# Patient Record
Sex: MALE | Race: BLACK OR AFRICAN AMERICAN | Employment: UNEMPLOYED | URBAN - METROPOLITAN AREA
[De-identification: names, ages, dates, MRNs, and addresses within clinical notes are randomized per-mention and may not be internally consistent; named-entity substitution may affect disease eponyms.]

---

## 2018-06-16 ENCOUNTER — HOSPITAL ENCOUNTER (EMERGENCY)
Age: 58
Discharge: HOME | End: 2018-06-16

## 2018-06-16 VITALS
HEIGHT: 73 IN | DIASTOLIC BLOOD PRESSURE: 89 MMHG | OXYGEN SATURATION: 96 % | TEMPERATURE: 98.4 F | RESPIRATION RATE: 20 BRPM | WEIGHT: 200 LBS | HEART RATE: 77 BPM | SYSTOLIC BLOOD PRESSURE: 157 MMHG | BODY MASS INDEX: 26.51 KG/M2

## 2018-06-16 DIAGNOSIS — G89.29 CHRONIC BILATERAL LOW BACK PAIN WITH RIGHT-SIDED SCIATICA: Primary | ICD-10-CM

## 2018-06-16 DIAGNOSIS — M54.41 CHRONIC BILATERAL LOW BACK PAIN WITH RIGHT-SIDED SCIATICA: Primary | ICD-10-CM

## 2018-06-16 PROCEDURE — 6370000000 HC RX 637 (ALT 250 FOR IP): Performed by: NURSE PRACTITIONER

## 2018-06-16 PROCEDURE — 99282 EMERGENCY DEPT VISIT SF MDM: CPT

## 2018-06-16 RX ORDER — OXYCODONE HYDROCHLORIDE AND ACETAMINOPHEN 5; 325 MG/1; MG/1
2 TABLET ORAL ONCE
Status: COMPLETED | OUTPATIENT
Start: 2018-06-16 | End: 2018-06-16

## 2018-06-16 RX ADMIN — OXYCODONE HYDROCHLORIDE AND ACETAMINOPHEN 2 TABLET: 5; 325 TABLET ORAL at 01:25

## 2018-06-16 ASSESSMENT — PAIN SCALES - GENERAL
PAINLEVEL_OUTOF10: 9
PAINLEVEL_OUTOF10: 9

## 2018-06-16 ASSESSMENT — PAIN DESCRIPTION - ORIENTATION: ORIENTATION: LOWER;RIGHT

## 2018-06-16 ASSESSMENT — PAIN DESCRIPTION - DESCRIPTORS: DESCRIPTORS: ACHING;NUMBNESS

## 2018-06-16 ASSESSMENT — PAIN DESCRIPTION - LOCATION: LOCATION: BACK;TOE (COMMENT WHICH ONE)

## 2020-09-17 ENCOUNTER — HOSPITAL ENCOUNTER (INPATIENT)
Age: 60
LOS: 5 days | Discharge: HOME | DRG: 885 | End: 2020-09-22
Attending: EMERGENCY MEDICINE | Admitting: PSYCHIATRY & NEUROLOGY
Payer: OTHER GOVERNMENT

## 2020-09-17 PROBLEM — F22 PARANOIA (PSYCHOSIS) (HCC): Status: ACTIVE | Noted: 2020-09-17

## 2020-09-17 LAB
ACETAMINOPHEN LEVEL: <5 MCG/ML (ref 10–30)
ALBUMIN SERPL-MCNC: 3.7 G/DL (ref 3.5–5.2)
ALP BLD-CCNC: 59 U/L (ref 40–129)
ALT SERPL-CCNC: 25 U/L (ref 0–40)
AMPHETAMINE SCREEN, URINE: NOT DETECTED
ANION GAP SERPL CALCULATED.3IONS-SCNC: 9 MMOL/L (ref 7–16)
AST SERPL-CCNC: 22 U/L (ref 0–39)
BACTERIA: ABNORMAL /HPF
BARBITURATE SCREEN URINE: NOT DETECTED
BASOPHILS ABSOLUTE: 0.04 E9/L (ref 0–0.2)
BASOPHILS RELATIVE PERCENT: 0.6 % (ref 0–2)
BENZODIAZEPINE SCREEN, URINE: NOT DETECTED
BILIRUB SERPL-MCNC: 0.5 MG/DL (ref 0–1.2)
BILIRUBIN URINE: NEGATIVE
BLOOD, URINE: NEGATIVE
BUN BLDV-MCNC: 11 MG/DL (ref 6–20)
CALCIUM SERPL-MCNC: 9.3 MG/DL (ref 8.6–10.2)
CANNABINOID SCREEN URINE: POSITIVE
CHLORIDE BLD-SCNC: 107 MMOL/L (ref 98–107)
CLARITY: CLEAR
CO2: 25 MMOL/L (ref 22–29)
COCAINE METABOLITE SCREEN URINE: POSITIVE
COLOR: YELLOW
CREAT SERPL-MCNC: 1 MG/DL (ref 0.7–1.2)
EOSINOPHILS ABSOLUTE: 0.15 E9/L (ref 0.05–0.5)
EOSINOPHILS RELATIVE PERCENT: 2.2 % (ref 0–6)
ETHANOL: <10 MG/DL (ref 0–0.08)
FENTANYL SCREEN, URINE: NOT DETECTED
GFR AFRICAN AMERICAN: >60
GFR NON-AFRICAN AMERICAN: >60 ML/MIN/1.73
GLUCOSE BLD-MCNC: 82 MG/DL (ref 74–99)
GLUCOSE URINE: NEGATIVE MG/DL
HCT VFR BLD CALC: 42.9 % (ref 37–54)
HEMOGLOBIN: 15.5 G/DL (ref 12.5–16.5)
IMMATURE GRANULOCYTES #: 0.02 E9/L
IMMATURE GRANULOCYTES %: 0.3 % (ref 0–5)
KETONES, URINE: ABNORMAL MG/DL
LEUKOCYTE ESTERASE, URINE: NEGATIVE
LYMPHOCYTES ABSOLUTE: 1.97 E9/L (ref 1.5–4)
LYMPHOCYTES RELATIVE PERCENT: 28.5 % (ref 20–42)
Lab: ABNORMAL
MCH RBC QN AUTO: 31.8 PG (ref 26–35)
MCHC RBC AUTO-ENTMCNC: 36.1 % (ref 32–34.5)
MCV RBC AUTO: 88.1 FL (ref 80–99.9)
METHADONE SCREEN, URINE: NOT DETECTED
MONOCYTES ABSOLUTE: 0.75 E9/L (ref 0.1–0.95)
MONOCYTES RELATIVE PERCENT: 10.9 % (ref 2–12)
NEUTROPHILS ABSOLUTE: 3.98 E9/L (ref 1.8–7.3)
NEUTROPHILS RELATIVE PERCENT: 57.5 % (ref 43–80)
NITRITE, URINE: NEGATIVE
OPIATE SCREEN URINE: NOT DETECTED
OXYCODONE URINE: NOT DETECTED
PDW BLD-RTO: 12.5 FL (ref 11.5–15)
PH UA: 6.5 (ref 5–9)
PHENCYCLIDINE SCREEN URINE: NOT DETECTED
PLATELET # BLD: 266 E9/L (ref 130–450)
PMV BLD AUTO: 10.4 FL (ref 7–12)
POTASSIUM SERPL-SCNC: 3.6 MMOL/L (ref 3.5–5)
PROTEIN UA: NEGATIVE MG/DL
RBC # BLD: 4.87 E12/L (ref 3.8–5.8)
RBC UA: ABNORMAL /HPF (ref 0–2)
SALICYLATE, SERUM: <0.3 MG/DL (ref 0–30)
SARS-COV-2, NAAT: NOT DETECTED
SODIUM BLD-SCNC: 141 MMOL/L (ref 132–146)
SPECIFIC GRAVITY UA: 1.02 (ref 1–1.03)
TOTAL PROTEIN: 6.1 G/DL (ref 6.4–8.3)
TRICYCLIC ANTIDEPRESSANTS SCREEN SERUM: NEGATIVE NG/ML
UROBILINOGEN, URINE: 1 E.U./DL
WBC # BLD: 6.9 E9/L (ref 4.5–11.5)
WBC UA: ABNORMAL /HPF (ref 0–5)

## 2020-09-17 PROCEDURE — 93005 ELECTROCARDIOGRAM TRACING: CPT | Performed by: STUDENT IN AN ORGANIZED HEALTH CARE EDUCATION/TRAINING PROGRAM

## 2020-09-17 PROCEDURE — 80307 DRUG TEST PRSMV CHEM ANLYZR: CPT

## 2020-09-17 PROCEDURE — 80053 COMPREHEN METABOLIC PANEL: CPT

## 2020-09-17 PROCEDURE — 99283 EMERGENCY DEPT VISIT LOW MDM: CPT

## 2020-09-17 PROCEDURE — 81001 URINALYSIS AUTO W/SCOPE: CPT

## 2020-09-17 PROCEDURE — G0480 DRUG TEST DEF 1-7 CLASSES: HCPCS

## 2020-09-17 PROCEDURE — 85025 COMPLETE CBC W/AUTO DIFF WBC: CPT

## 2020-09-17 PROCEDURE — 1240000000 HC EMOTIONAL WELLNESS R&B

## 2020-09-17 PROCEDURE — U0002 COVID-19 LAB TEST NON-CDC: HCPCS

## 2020-09-17 RX ORDER — HYDROXYZINE PAMOATE 50 MG/1
50 CAPSULE ORAL 3 TIMES DAILY PRN
Status: DISCONTINUED | OUTPATIENT
Start: 2020-09-17 | End: 2020-09-22 | Stop reason: HOSPADM

## 2020-09-17 RX ORDER — MAGNESIUM HYDROXIDE/ALUMINUM HYDROXICE/SIMETHICONE 120; 1200; 1200 MG/30ML; MG/30ML; MG/30ML
30 SUSPENSION ORAL PRN
Status: DISCONTINUED | OUTPATIENT
Start: 2020-09-17 | End: 2020-09-22 | Stop reason: HOSPADM

## 2020-09-17 RX ORDER — ACETAMINOPHEN 325 MG/1
650 TABLET ORAL EVERY 6 HOURS PRN
Status: DISCONTINUED | OUTPATIENT
Start: 2020-09-17 | End: 2020-09-22 | Stop reason: HOSPADM

## 2020-09-17 RX ORDER — HALOPERIDOL 5 MG
5 TABLET ORAL EVERY 6 HOURS PRN
Status: DISCONTINUED | OUTPATIENT
Start: 2020-09-17 | End: 2020-09-22 | Stop reason: HOSPADM

## 2020-09-17 RX ORDER — TRAZODONE HYDROCHLORIDE 50 MG/1
50 TABLET ORAL NIGHTLY PRN
Status: DISCONTINUED | OUTPATIENT
Start: 2020-09-17 | End: 2020-09-22 | Stop reason: HOSPADM

## 2020-09-17 RX ORDER — HALOPERIDOL 5 MG/ML
5 INJECTION INTRAMUSCULAR EVERY 6 HOURS PRN
Status: DISCONTINUED | OUTPATIENT
Start: 2020-09-17 | End: 2020-09-22 | Stop reason: HOSPADM

## 2020-09-17 ASSESSMENT — ENCOUNTER SYMPTOMS
COUGH: 0
VOMITING: 0
NAUSEA: 0
BACK PAIN: 0
COLOR CHANGE: 0
CHEST TIGHTNESS: 0
DIARRHEA: 0
ABDOMINAL PAIN: 0
WHEEZING: 0
SHORTNESS OF BREATH: 0
CONSTIPATION: 0

## 2020-09-17 NOTE — ED NOTES
Emergency Department [facility] AN AFFILIATE OF [facility] Biopsychosocial Assessment Note    Chief Complaint:  The pt is a 64 yr old AA male who presents to the [place] referred by his counselor at the [place] due to thoughts to harm others and paranoia. MSE:  The pt presents calm and cooperative- good eye contact- oriented times 4- good historian. Reports on going auditory hallucinations which have been getting louder lately. Clinical Summary/History:  The pt is a 64 yr old AA male who presents to the ED referred by his counselor at the [place] due to thoughts to harm others and paranoia. He stated that he has not been taking his meds for the last several weeks and prior to that was inconsistent. He stated that even when he takes his meds they do not work. He stated that he fears taking the meds b/c he is afraid that he will get the side effects. He denied a hx of SI and inpt psych admits. He stated that he has had thoughts to harm others in the past but has never harmed anyone. He stated that he stays away from the public due to these thoughts and his paranoia. He reported  that he has PTSD due to his training in the [employer]. He stated that he had to do survival drills that caused his PTSD. 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 He stated that he got a lot of time b/c he was arrested a lot. He denies any hx of a violent crime and stated that he attributes his life being saved by doing a lot of [facility] time. Call to the [place] and spoke to Shlomo Dangelo who reported that there are no beds today. He stated that he will f/u with our psych montiel tomorrow. Gender  [x] Male [] Female [] Transgender  [] Other    Sexual Orientation    [x] Heterosexual [] Homosexual [] Bisexual [] Other    Suicidal Behavioral: CSSR-S Complete.   [] Reports:    [] Past [] Present   [x] Denies    Homicidal/ Violent Behavior  [x] Reports:   [x] Past [x] Present   [] Denies     Hallucinations/Delusions   [x] Reports: Voices that tell him to run or get away   [] Denies     Substance Use/Alcohol Use/Addiction: SBIRT Screen Complete.    [x] Reports:  Binge cocaine use  [] Denies     Trauma History  [x] Reports: custodial 20 yrs- released a couple yrs ago  [] Denies     Collateral Information:   None      Level of Care/Disposition Plan  [] Home:   [] Outpatient Provider:   [] Crisis Unit:   [x] Inpatient Psychiatric Unit: [facility]  [] Other:        Lizeth Cotter, [facility]  09/17/20 1935

## 2020-09-17 NOTE — ED NOTES
FIRST PROVIDER CONTACT ASSESSMENT NOTE      Department of Emergency Medicine   ED  First Provider Note   9/17/20  3:18 PM EDT    Chief Complaint: Psychiatric Evaluation (increased paranoia, hx PTSD, states off meds for last four days, -SI +HI, states he is hearing voices)      History of Present Illness:    Akbar Moreno is a 64 y.o. male who presents to the ED for feeling paranoid. He reports Hx of PTSD and [facility] Denies SI. Admits to the use of Cocaine and marijuana. States that he is hearing voices that tell him to run. Denies visual hallucinations. Focused Screening Exam:  Constitutional:  Alert, appears stated age and is in no distress.       *ALLERGIES*     Sulfa antibiotics     ED Triage Vitals [09/17/20 1504]   BP Temp Temp Source Pulse Resp SpO2 Height Weight   -- 97.1 °F (36.2 °C) Skin 80 18 97 % 6' 1\" (1.854 m) 220 lb (99.8 kg)        Initial Plan of Care:  Initiate Treatment-Testing, Proceed toTreatment Area When Bed Available for ED Attending/MLP to Continue Care    -----------------END OF FIRST PROVIDER CONTACT ASSESSMENT NOTE--------------  Electronically signed by NETO Cao CNP   DD: 9/17/20     NETO Cao CNP  09/17/20 1800

## 2020-09-18 PROBLEM — F31.2 BIPOLAR AFFECTIVE DISORDER, CURRENT EPISODE MANIC WITH PSYCHOTIC SYMPTOMS (HCC): Status: ACTIVE | Noted: 2020-09-18

## 2020-09-18 PROBLEM — F14.10 COCAINE ABUSE (HCC): Status: ACTIVE | Noted: 2020-09-18

## 2020-09-18 LAB
EKG ATRIAL RATE: 76 BPM
EKG P AXIS: 51 DEGREES
EKG P-R INTERVAL: 168 MS
EKG Q-T INTERVAL: 408 MS
EKG QRS DURATION: 88 MS
EKG QTC CALCULATION (BAZETT): 459 MS
EKG T AXIS: 8 DEGREES
EKG VENTRICULAR RATE: 76 BPM

## 2020-09-18 PROCEDURE — 93010 ELECTROCARDIOGRAM REPORT: CPT | Performed by: INTERNAL MEDICINE

## 2020-09-18 PROCEDURE — 99222 1ST HOSP IP/OBS MODERATE 55: CPT | Performed by: PSYCHIATRY & NEUROLOGY

## 2020-09-18 PROCEDURE — 6370000000 HC RX 637 (ALT 250 FOR IP): Performed by: PSYCHIATRY & NEUROLOGY

## 2020-09-18 PROCEDURE — 1240000000 HC EMOTIONAL WELLNESS R&B

## 2020-09-18 RX ORDER — POLYETHYLENE GLYCOL 3350 17 G
2 POWDER IN PACKET (EA) ORAL PRN
Status: DISCONTINUED | OUTPATIENT
Start: 2020-09-18 | End: 2020-09-22 | Stop reason: HOSPADM

## 2020-09-18 RX ORDER — ARIPIPRAZOLE 15 MG/1
15 TABLET ORAL DAILY
Status: DISCONTINUED | OUTPATIENT
Start: 2020-09-18 | End: 2020-09-22 | Stop reason: HOSPADM

## 2020-09-18 RX ORDER — DIVALPROEX SODIUM 250 MG/1
500 TABLET, DELAYED RELEASE ORAL EVERY 12 HOURS SCHEDULED
Status: DISCONTINUED | OUTPATIENT
Start: 2020-09-18 | End: 2020-09-20

## 2020-09-18 RX ADMIN — DIVALPROEX SODIUM 500 MG: 250 TABLET, DELAYED RELEASE ORAL at 21:48

## 2020-09-18 RX ADMIN — DIVALPROEX SODIUM 500 MG: 250 TABLET, DELAYED RELEASE ORAL at 12:41

## 2020-09-18 RX ADMIN — ARIPIPRAZOLE 15 MG: 15 TABLET ORAL at 12:42

## 2020-09-18 ASSESSMENT — SLEEP AND FATIGUE QUESTIONNAIRES
AVERAGE NUMBER OF SLEEP HOURS: 2
DIFFICULTY ARISING: NO
SLEEP PATTERN: DIFFICULTY FALLING ASLEEP;DISTURBED/INTERRUPTED SLEEP;RESTLESSNESS;NIGHTMARES/TERRORS
SLEEP PATTERN: DIFFICULTY FALLING ASLEEP
DO YOU HAVE DIFFICULTY SLEEPING: YES
DIFFICULTY STAYING ASLEEP: NO
DIFFICULTY STAYING ASLEEP: YES
RESTFUL SLEEP: NO
DO YOU USE A SLEEP AID: NO
AVERAGE NUMBER OF SLEEP HOURS: 5
DIFFICULTY FALLING ASLEEP: YES
DO YOU USE A SLEEP AID: YES
RESTFUL SLEEP: NO
DIFFICULTY ARISING: NO
DIFFICULTY FALLING ASLEEP: YES
DO YOU HAVE DIFFICULTY SLEEPING: YES

## 2020-09-18 ASSESSMENT — LIFESTYLE VARIABLES
HISTORY_ALCOHOL_USE: NO
HISTORY_ALCOHOL_USE: YES

## 2020-09-18 ASSESSMENT — PATIENT HEALTH QUESTIONNAIRE - PHQ9
SUM OF ALL RESPONSES TO PHQ QUESTIONS 1-9: 17
SUM OF ALL RESPONSES TO PHQ QUESTIONS 1-9: 17

## 2020-09-18 ASSESSMENT — PAIN SCALES - GENERAL: PAINLEVEL_OUTOF10: 0

## 2020-09-18 NOTE — PROGRESS NOTES
[facility]  Initial Interdisciplinary Treatment Plan NOTE    Review Date & Time: 09/18/2020 5026     Patient was in treatment team    Admission Type:   Admission Type: Involuntary    Reason for admission:  Reason for Admission: \"cause my threrapist suggested I go because I was spiraling out of control, hearing voices I couldn't stop, anxiety real high, paranoid\"      Estimated Length of Stay Update:  5-7 days   Estimated Discharge Date Update: 09/24/2020    PATIENT STRENGTHS:  Patient Strengths Strengths: Communication  Patient Strengths and Limitations:Limitations: Tendency to isolate self  Addictive Behavior:Addictive Behavior  In the past 3 months, have you felt or has someone told you that you have a problem with:  : None  Do you have a history of Chemical Use?: Yes  Do you have a history of Alcohol Use?: No  Do you have a history of Street Drug Abuse?: No  Histroy of Prescripton Drug Abuse?: No  Medical Problems:History reviewed. No pertinent past medical history.     EDUCATION:   Learner Progress Toward Treatment Goals: Reviewed group plan and strategies    Method: Small group    Outcome: Needs reinforcement    PATIENT GOALS:     PLAN/TREATMENT RECOMMENDATIONS UPDATE:09/20/2020    GOALS UPDATE:   Time frame for Short-Term Goals: 3-5 days     Jenna Peoples RN

## 2020-09-18 NOTE — ED NOTES
Patient accepted to 7S by Dr. Gerardo Ayala PENDING negative COVID result. Vladislav Vick RN notified.       Saida Howe RN  09/17/20 2053       Sadia Howe RN  09/17/20 2053

## 2020-09-18 NOTE — H&P
PSYCHIATRIC EVALUATION      CHIEF COMPLAINT: I spiral real bad every year and ended up doing crime and halfway. I was almost getting there and was paranoid impulsive and then called my outpatient counselor\"    HISTORY OF PRESENT ILLNESS:   Patient is a 72-year-old  -American man father of 3 children all are adult and is a retired  from [age] discharge honorably, currently on parole, with history of bipolar disorder and PTSD which is resolved noncompliant and follows up at [place] outpatient clinic in [facility], was living for 9 months with a girlfriend 22-year-old , who kicked him out about 1 and half month ago and he became homeless. He also has history of 20 years of imprisonment in [place] [facility] and currently on parole he has longstanding history of cocaine abuse but was sober for 20 years now started doing the cocaine 2 weeks ago again and he was ruminating about the relationship that ended up 1/2-month ago and thinking that she was getting more paranoid and she was thinking heart being in fetal and having homicidal suicidal thought. He said he did not want to commit any crime and that is why he called counselor who advised him to come to the ER. In the ED he was very paranoid and easily agitated. He was medically clear his urine tox was positive for cocaine and was admitted to [facility]. He tells me that the medication that he was getting not doing any good and he did not feel any difference. He was given Zoloft BuSpar and other medication which never worked for his bipolar illness and symptoms. 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 He got a lot of arrest in the past.  He denies any history of violent crime but he states he activities like being saved by doing a lot of present time. He endorses a lot of symptoms of paranoia and mood swings and volatile anger outburst at times.   He said he is currently going through this kind of symptoms and he is hyper does not take any sleep has high energy impulsivity and thinking that people are doing something. He is avoiding the public. He said that he has bad thoughts and paranoia. He said he wants to take the medication which will consistently make him stable. He said he is not compliant with the medication all the time. He also feels guilty about starting cocaine back but he said he does not want to be in that life again. He said he applied for VA disability but this pending as well as the Social Security benefit. He demonstrated poor insight and judgment but want some help at this time. He is still having those bad thoughts but there is no specific person he is homicidal towards. PAST PSYCHIATRIC HISTORY:  History of bipolar disorder PTSD but this is the first inpatient psychiatric hospitalization. Patient is being managed by counselor and nurse practitioner via [facility]. Patient said that no medication works for him and he wants something consistently in injectable form. PAST MEDICAL HISTORY: History reviewed. No pertinent past medical history. MEDICAL ROS:   All review of systems are negative except what is stated in HPI. ALLERGIES: Sulfa antibiotics    FAMILY PSYCHIATRIC HISTORY:  Sister has bipolar disorder    SUBSTANCE ABUSE HISTORY:   He was tested positive for cocaine. He said that he was sober for 20 years and now 2 weeks ago he relapsed on cocaine and he does not want to do it anymore.     VITALS: BP (!) 143/83   Pulse 64   Temp 98.2 °F (36.8 °C)   Resp 15   Ht 6' 1\" (1.854 m)   Wt 220 lb (99.8 kg)   SpO2 98%   BMI 29.03 kg/m²      Physical Examination:     Head: x  Atraumatic: x normocephalic  Skin and Mucosa        Moist x  Dry   Pale  x Normal   Neck:  Thyroid  Palpable   x  Not palpable   venus distention   adenopathy   Chest: x Clear   Rhonchi     Wheezing   CV:  xS1   xS2    xNo murmer   Abdomen:  x  Soft    Tender    Viceromegaly   Extremities:  x No Edema     Edema     Cranial Nerves Examination:     CN II:   xPupils are reactive to light  Pupils are non reactive to light  CN III, IV, VI:  xNo eye deviation    No diplopia or ptosis   CN V:    xFacial Sensation is intact     Facial Sensation is not intact   CN IIIV:   x Hearing is normal to rubbing fingers   CN IX, X:     xNormal gag reflex and phonation   CN XI:   xShoulder shrug and neck rotation is normal  CNXII:    xTongue is midline no deviation or atrophy        For further PE refer to ED note    MENTAL STATUS EXAM:   Mental status examination revealed a 46-year-old well-built well-nourished Afro-American man casually dressed calm cooperative but little guarded. Psychomotor revealed no agitation retardation or any other abnormal movement. Eye contact was fair. Speech was loud and fast.  Mood \"I am in the process of spiraling real bad\", affect is angry irritable easily agitated. Thought process with some flights of ideas and racing thoughts. Thought contents with paranoia and guarded suspiciousness and if forced to get away from the public or group. Denies suicidal ideation but still have thoughts of hurting others but contracts safety while he is here. Insight and judgment poor. Impulse control poor .  Cognitive function seem to at the baseline alert and oriented pleasant person      LABS:   Admission on 09/17/2020   Component Date Value Ref Range Status    WBC 09/17/2020 6.9  4.5 - 11.5 E9/L Final    RBC 09/17/2020 4.87  3.80 - 5.80 E12/L Final    Hemoglobin 09/17/2020 15.5  12.5 - 16.5 g/dL Final    Hematocrit 09/17/2020 42.9  37.0 - 54.0 % Final    MCV 09/17/2020 88.1  80.0 - 99.9 fL Final    MCH 09/17/2020 31.8  26.0 - 35.0 pg Final    MCHC 09/17/2020 36.1* 32.0 - 34.5 % Final    RDW 09/17/2020 12.5  11.5 - 15.0 fL Final    Platelets 41/88/5483 266  130 - 450 E9/L Final    MPV 09/17/2020 10.4  7.0 - 12.0 fL Final    Neutrophils % 09/17/2020 57.5  43.0 - 80.0 % Final    Immature Granulocytes % 09/17/2020 0.3  0.0 - 5.0 % Final    Lymphocytes % 09/17/2020 28.5  20.0 - 42.0 % Final    Monocytes % 09/17/2020 10.9  2.0 - 12.0 % Final    Eosinophils % 09/17/2020 2.2  0.0 - 6.0 % Final    Basophils % 09/17/2020 0.6  0.0 - 2.0 % Final    Neutrophils Absolute 09/17/2020 3.98  1.80 - 7.30 E9/L Final    Immature Granulocytes # 09/17/2020 0.02  E9/L Final    Lymphocytes Absolute 09/17/2020 1.97  1.50 - 4.00 E9/L Final    Monocytes Absolute 09/17/2020 0.75  0.10 - 0.95 E9/L Final    Eosinophils Absolute 09/17/2020 0.15  0.05 - 0.50 E9/L Final    Basophils Absolute 09/17/2020 0.04  0.00 - 0.20 E9/L Final    Sodium 09/17/2020 141  132 - 146 mmol/L Final    Potassium 09/17/2020 3.6  3.5 - 5.0 mmol/L Final    Chloride 09/17/2020 107  98 - 107 mmol/L Final    CO2 09/17/2020 25  22 - 29 mmol/L Final    Anion Gap 09/17/2020 9  7 - 16 mmol/L Final    Glucose 09/17/2020 82  74 - 99 mg/dL Final    BUN 09/17/2020 11  6 - 20 mg/dL Final    CREATININE 09/17/2020 1.0  0.7 - 1.2 mg/dL Final    GFR Non- 09/17/2020 >60  >=60 mL/min/1.73 Final    Comment: Chronic Kidney Disease: less than 60 ml/min/1.73 sq.m. Kidney Failure: less than 15 ml/min/1.73 sq.m. Results valid for patients 18 years and older.       GFR  09/17/2020 >60   Final    Calcium 09/17/2020 9.3  8.6 - 10.2 mg/dL Final    Total Protein 09/17/2020 6.1* 6.4 - 8.3 g/dL Final    Alb 09/17/2020 3.7  3.5 - 5.2 g/dL Final    Total Bilirubin 09/17/2020 0.5  0.0 - 1.2 mg/dL Final    Alkaline Phosphatase 09/17/2020 59  40 - 129 U/L Final    ALT 09/17/2020 25  0 - 40 U/L Final    AST 09/17/2020 22  0 - 39 U/L Final    Amphetamine Screen, Urine 09/17/2020 NOT DETECTED  Negative <1000 ng/mL Final    Barbiturate Screen, Ur 09/17/2020 NOT DETECTED  Negative < 200 ng/mL Final    Benzodiazepine Screen, Urine 09/17/2020 NOT DETECTED  Negative < 200 ng/mL Final    Cannabinoid Scrn, Ur 09/17/2020 POSITIVE* Negative < 50ng/mL Final    Cocaine Metabolite Screen, Urine 09/17/2020 POSITIVE* Negative < 300 ng/mL Final    Opiate Scrn, Ur 09/17/2020 NOT DETECTED  Negative < 300ng/mL Final    Note:  The Opiate Screen is not intended to detect Oxycodone.  PCP Screen, Urine 09/17/2020 NOT DETECTED  Negative < 25 ng/mL Final    Methadone Screen, Urine 09/17/2020 NOT DETECTED  Negative <300 ng/mL Final    Oxycodone Urine 09/17/2020 NOT DETECTED  Negative <100 ng/mL Final    FENTANYL SCREEN, URINE 09/17/2020 NOT DETECTED  Negative <1 ng/mL Final    Drug Screen Comment: 09/17/2020 see below   Final    Comment: These drug screen results are for medical purposes only and  should not be considered definitive or confirmed. The drug  methodology concentration value must be greater than or equal  to the cutoff to be reported as positive. Confirmatory testing  orders and/or interpretive screening questions can be directed  to toxicology at [phone]. The absence of expected drug(s) and/or metabolite(s) may be due  to inappropriate timing of specimen collection relative to drug  administration, poor drug absorption, diluted/adulterated urine,  or limitations of screening testing methodology.       Ethanol Lvl 09/17/2020 <10  mg/dL Final    Not Detected    Acetaminophen Level 09/17/2020 <5.0* 10.0 - 30.0 mcg/mL Final    Salicylate, Serum 54/86/6615 <0.3  0.0 - 30.0 mg/dL Final    TCA Scrn 09/17/2020 NEGATIVE  Cutoff:300 ng/mL Final    Color, UA 09/17/2020 Yellow  Straw/Yellow Final    Clarity, UA 09/17/2020 Clear  Clear Final    Glucose, Ur 09/17/2020 Negative  Negative mg/dL Final    Bilirubin Urine 09/17/2020 Negative  Negative Final    Ketones, Urine 09/17/2020 TRACE* Negative mg/dL Final    Specific Cyrus, UA 09/17/2020 1.020  1.005 - 1.030 Final    Blood, Urine 09/17/2020 Negative  Negative Final    pH, UA 09/17/2020 6.5  5.0 - 9.0 Final    Protein, UA 09/17/2020 Negative  Negative mg/dL Final    Urobilinogen, Urine 09/17/2020 1.0  <2.0 E.U./dL Final    Nitrite, Urine 09/17/2020 Negative  Negative Final    Leukocyte Esterase, Urine 09/17/2020 Negative  Negative Final    WBC, UA 09/17/2020 0-1  0 - 5 /HPF Final    RBC, UA 09/17/2020 1-3  0 - 2 /HPF Final    Bacteria, UA 09/17/2020 NONE SEEN  None Seen /HPF Final    SARS-CoV-2, NAAT 09/17/2020 Not Detected  Not Detected Final    Comment: Rapid NAAT:   Negative results should be treated as presumptive and,  if inconsistent with clinical signs and symptoms or necessary for  patient management, should be tested with an alternative molecular  assay. Negative results do not preclude SARS-CoV-2 infection and  should not be used as the sole basis for patient management decisions. This test has been authorized by the FDA under an Emergency Use  Authorization (EUA) for use by authorized laboratories.     Fact sheet for Healthcare Providers:  kstattoo.com  Fact sheet for Patients: kstattoo.com    METHODOLOGY: Isothermal Nucleic Acid Amplification      Ventricular Rate 09/17/2020 76  BPM Final    Atrial Rate 09/17/2020 76  BPM Final    P-R Interval 09/17/2020 168  ms Final    QRS Duration 09/17/2020 88  ms Final    Q-T Interval 09/17/2020 408  ms Final    QTc Calculation (Bazett) 09/17/2020 459  ms Final    P Axis 09/17/2020 51  degrees Final    T Axis 09/17/2020 8  degrees Final           MEDICATIONS: Current Facility-Administered Medications: nicotine polacrilex (COMMIT) lozenge 2 mg, 2 mg, Oral, PRN  divalproex (DEPAKOTE) DR tablet 500 mg, 500 mg, Oral, 2 times per day  ARIPiprazole (ABILIFY) tablet 15 mg, 15 mg, Oral, Daily  [START ON 9/22/2020] ARIPiprazole lauroxil (ARISTADA) injection 662 mg, 662 mg, Intramuscular, Q30 Days  acetaminophen (TYLENOL) tablet 650 mg, 650 mg, Oral, Q6H PRN  hydrOXYzine (VISTARIL) capsule 50 mg, 50 mg, Oral, TID PRN  haloperidol lactate (HALDOL) injection 5 mg, 5 mg, Intramuscular, Q6H PRN **OR** haloperidol (HALDOL) tablet 5 mg, 5 mg, Oral, Q6H PRN  traZODone (DESYREL) tablet 50 mg, 50 mg, Oral, Nightly PRN  magnesium hydroxide (MILK OF MAGNESIA) 400 MG/5ML suspension 30 mL, 30 mL, Oral, Daily PRN  aluminum & magnesium hydroxide-simethicone (MAALOX) 200-200-20 MG/5ML suspension 30 mL, 30 mL, Oral, PRN     ASSESSMENT:   Bipolar disorder current episode manic with psychotic feature  Cocaine abuse    PLAN:   Collateral information  Group  Echols-milieu  Psychoeducation  Educated the patient that if he chooses to continue with the drugs cocaine alcohol, he may potentially act out impulsively more, resulting in serious harm to self or others even though unintentional.  Also counseled that mental health treatment cannot be optimized with ongoing use of drugs  He demonstrated understanding and has the capacity to understand that  I also discussed the diagnosis treatment plan and long-term injectable form and he accepted the treatment and consented to the plan.   I discussed the risk benefits side effect possible outcome and alternatives of the medication and he is agreeable to the plan  We will start Depakote 500 mg twice daily and Abilify 15 mg daily  We will start Aristada injection 662 mg every 30 days first 1 next week as ordered  Medical to follow-up with any medical issue  Referral to drug rehab counseling at the time of discharge  Expected length of stay 3 to 5 days based on stability  [facility] we may transfer the patient if patient is agreeable      Signed:  Ines Chilel  9/18/2020  1:46 PM

## 2020-09-18 NOTE — ED NOTES
Rickey Rosenthal from the [place] called and stated that they do not have a bed and will follow up tomorrow.      [address] [facility]  09/17/20 2052

## 2020-09-18 NOTE — GROUP NOTE
Group Therapy Note    Date: 9/18/2020    Group Start Time: 1125  Group End Time: 1200  Group Topic: Cognitive Skills    [facility]    VALERIE Dale        Group Therapy Note    Attendees: 18         Patient's Goal:  Pt will be able to identify negative communication principles they want to work on and be able to identify positive communication principles. Pt will be able to display good understanding of I feel statements. Notes: Pt active in class discussion. Status After Intervention:  Improved    Participation Level:  Active Listener and Interactive    Participation Quality: Appropriate, Attentive, Sharing and Supportive      Speech:  normal      Thought Process/Content: Logical      Affective Functioning: Blunted      Mood: depressed      Level of consciousness:  Alert, Oriented x4 and Attentive      Response to Learning: Able to verbalize current knowledge/experience, Able to verbalize/acknowledge new learning and Progressing to goal      Endings: None Reported    Modes of Intervention: Education, Support, Socialization and Problem-solving      Discipline Responsible: /Counselor      Signature:  VALERIE Grant

## 2020-09-18 NOTE — ED PROVIDER NOTES
Patient is a 53-year-old male with past medical history significant for PTSD and depression who presents the ED today with chief complaint of paranoia and for psych eval.  Patient states that he has PTSD secondary to being a  and that he has been having increased paranoia as he is been off his medications for the last 4 days. He denies any suicidal or homicidal ideation to me. States that he is hearing voices that are yelling at him. He denies any physical symptoms including headache, dizziness, fever, chest pain, cough, nausea, vomiting, abdominal pain, diarrhea, constipation, urinary symptoms. Review of Systems   Constitutional: Negative for chills and fatigue. HENT: Negative for drooling and mouth sores. Eyes: Negative for visual disturbance. Respiratory: Negative for cough, chest tightness, shortness of breath and wheezing. Cardiovascular: Negative for chest pain and palpitations. Gastrointestinal: Negative for abdominal pain, constipation, diarrhea, nausea and vomiting. Genitourinary: Negative for dysuria and frequency. Musculoskeletal: Negative for back pain. Skin: Negative for color change and wound. Neurological: Negative for headaches. Psychiatric/Behavioral: Positive for agitation, dysphoric mood and hallucinations. Negative for suicidal ideas. The patient is nervous/anxious. Physical Exam  Constitutional:       Appearance: Normal appearance. He is obese. HENT:      Head: Normocephalic and atraumatic. Right Ear: External ear normal.      Left Ear: External ear normal.      Nose: Nose normal.      Mouth/Throat:      Mouth: Mucous membranes are moist.      Pharynx: Oropharynx is clear. Eyes:      Extraocular Movements: Extraocular movements intact. Conjunctiva/sclera: Conjunctivae normal.   Neck:      Musculoskeletal: Normal range of motion and neck supple. Cardiovascular:      Rate and Rhythm: Normal rate and regular rhythm.       Pulses: Normal pulses. Heart sounds: Normal heart sounds. No murmur. No friction rub. No gallop. Pulmonary:      Effort: Pulmonary effort is normal.      Breath sounds: Normal breath sounds. Abdominal:      General: Bowel sounds are normal.      Palpations: Abdomen is soft. Skin:     General: Skin is warm and dry. Neurological:      General: No focal deficit present. Mental Status: He is alert and oriented to person, place, and time. Psychiatric:         Attention and Perception: Attention normal. He perceives auditory hallucinations. Mood and Affect: Mood is anxious and depressed. Speech: Speech normal.         Behavior: Behavior is slowed. Thought Content: Thought content is paranoid and delusional. Thought content does not include homicidal or suicidal ideation. Thought content does not include homicidal or suicidal plan. Judgment: Judgment is impulsive and inappropriate. Procedures     MDM  Number of Diagnoses or Management Options  Diagnosis management comments: Patient presents with chief complaints of paranoia delusions, hallucinations. Patient is off his psych meds. Psych labs are all unremarkable. Patient cleared for [employer] [facility] at this time. --------------------------------------------- PAST HISTORY ---------------------------------------------  Past Medical History:  has no past medical history on file. Past Surgical History:  has no past surgical history on file. Social History:  reports that he has been smoking cigarettes. He has been smoking about 0.50 packs per day. He has never used smokeless tobacco. He reports current alcohol use. He reports current drug use. Drugs: Cocaine and Marijuana. Family History: family history is not on file. The patients home medications have been reviewed.     Allergies: Sulfa antibiotics    -------------------------------------------------- RESULTS -------------------------------------------------  Labs:  Results for orders placed or performed during the hospital encounter of 09/17/20   CBC auto differential   Result Value Ref Range    WBC 6.9 4.5 - 11.5 E9/L    RBC 4.87 3.80 - 5.80 E12/L    Hemoglobin 15.5 12.5 - 16.5 g/dL    Hematocrit 42.9 37.0 - 54.0 %    MCV 88.1 80.0 - 99.9 fL    MCH 31.8 26.0 - 35.0 pg    MCHC 36.1 (H) 32.0 - 34.5 %    RDW 12.5 11.5 - 15.0 fL    Platelets 156 492 - 264 E9/L    MPV 10.4 7.0 - 12.0 fL    Neutrophils % 57.5 43.0 - 80.0 %    Immature Granulocytes % 0.3 0.0 - 5.0 %    Lymphocytes % 28.5 20.0 - 42.0 %    Monocytes % 10.9 2.0 - 12.0 %    Eosinophils % 2.2 0.0 - 6.0 %    Basophils % 0.6 0.0 - 2.0 %    Neutrophils Absolute 3.98 1.80 - 7.30 E9/L    Immature Granulocytes # 0.02 E9/L    Lymphocytes Absolute 1.97 1.50 - 4.00 E9/L    Monocytes Absolute 0.75 0.10 - 0.95 E9/L    Eosinophils Absolute 0.15 0.05 - 0.50 E9/L    Basophils Absolute 0.04 0.00 - 0.20 E9/L   Comprehensive Metabolic Panel   Result Value Ref Range    Sodium 141 132 - 146 mmol/L    Potassium 3.6 3.5 - 5.0 mmol/L    Chloride 107 98 - 107 mmol/L    CO2 25 22 - 29 mmol/L    Anion Gap 9 7 - 16 mmol/L    Glucose 82 74 - 99 mg/dL    BUN 11 6 - 20 mg/dL    CREATININE 1.0 0.7 - 1.2 mg/dL    GFR Non-African American >60 >=60 mL/min/1.73    GFR African American >60     Calcium 9.3 8.6 - 10.2 mg/dL    Total Protein 6.1 (L) 6.4 - 8.3 g/dL    Alb 3.7 3.5 - 5.2 g/dL    Total Bilirubin 0.5 0.0 - 1.2 mg/dL    Alkaline Phosphatase 59 40 - 129 U/L    ALT 25 0 - 40 U/L    AST 22 0 - 39 U/L   Urine Drug Screen   Result Value Ref Range    Amphetamine Screen, Urine NOT DETECTED Negative <1000 ng/mL    Barbiturate Screen, Ur NOT DETECTED Negative < 200 ng/mL    Benzodiazepine Screen, Urine NOT DETECTED Negative < 200 ng/mL    Cannabinoid Scrn, Ur POSITIVE (A) Negative < 50ng/mL    Cocaine Metabolite Screen, Urine POSITIVE (A) Negative < 300 ng/mL    Opiate Scrn, Ur NOT DETECTED Negative < 300ng/mL    PCP Screen, Urine NOT DETECTED Negative < 25 ng/mL    Methadone Screen, Urine NOT DETECTED Negative <300 ng/mL    Oxycodone Urine NOT DETECTED Negative <100 ng/mL    FENTANYL SCREEN, URINE NOT DETECTED Negative <1 ng/mL    Drug Screen Comment: see below    Serum Drug Screen   Result Value Ref Range    Ethanol Lvl <10 mg/dL    Acetaminophen Level <5.0 (L) 10.0 - 91.4 mcg/mL    Salicylate, Serum <1.1 0.0 - 30.0 mg/dL    TCA Scrn NEGATIVE Cutoff:300 ng/mL   Urinalysis with Microscopic   Result Value Ref Range    Color, UA Yellow Straw/Yellow    Clarity, UA Clear Clear    Glucose, Ur Negative Negative mg/dL    Bilirubin Urine Negative Negative    Ketones, Urine TRACE (A) Negative mg/dL    Specific Gravity, UA 1.020 1.005 - 1.030    Blood, Urine Negative Negative    pH, UA 6.5 5.0 - 9.0    Protein, UA Negative Negative mg/dL    Urobilinogen, Urine 1.0 <2.0 E.U./dL    Nitrite, Urine Negative Negative    Leukocyte Esterase, Urine Negative Negative    WBC, UA 0-1 0 - 5 /HPF    RBC, UA 1-3 0 - 2 /HPF    Bacteria, UA NONE SEEN None Seen /HPF   COVID-19   Result Value Ref Range    SARS-CoV-2, NAAT Not Detected Not Detected       Radiology:  No orders to display       ------------------------- NURSING NOTES AND VITALS REVIEWED ---------------------------  Date / Time Roomed:  9/17/2020  3:19 PM  ED Bed Assignment:  [place] F/    The nursing notes within the ED encounter and vital signs as below have been reviewed. BP (!) 140/82   Pulse 64   Temp 97 °F (36.1 °C) (Temporal)   Resp 17   Ht 6' 1\" (1.854 m)   Wt 220 lb (99.8 kg)   SpO2 98%   BMI 29.03 kg/m²   Oxygen Saturation Interpretation: Normal        Diagnosis:  1. Hallucinations    2. Paranoia ([address]    3. Psychosis, unspecified psychosis type ([address]        Disposition:  Patient's disposition: Patient medically cleared for psychiatric admission. Patient's condition is stable.        Meena Zahida, DO  Resident  09/17/20 9930

## 2020-09-18 NOTE — ED NOTES
Patient accepted to 7S, room 7518. NURSE TO NURSE TO BE CALLED TO [id number].        John Bueno RN  09/17/20 5512

## 2020-09-18 NOTE — PROGRESS NOTES
1:1 to complete admission data base. Pt cooperative and offered information readily during interview. Alert and oriented x 4. Thoughts logical organized and relevant. Denied suicidal ideations and ever being suicidal, but does admit to \"thinking about what it would be like if I wasn't here\". Denied homicidal ideations, but admits that his anxiety causes him to  \"want to hurt someone sometimes\". Admitted to recent auditory hallucinations and hears voices telling him to run and hide. No preoccupation or delusions revealed. Is anxious, paranoid, depressed and sad. Reports poor sleep and the melatonin prescribed for him doesn't work. Has not been compliant with his medications. Has chronic back pain and \"sciatica\" with rt foot drop. Pt did not bring his cane or AFO boot to the hospital.  Pt had been living with his girlfriend until they broke up recently, now he is homeless and has been sleeping in his car. Pt said he is considering filing retraining order against his ex girlfriend because she is \"stalking him\" and he left [facility] and came to [facility] to evade her. This break up with the girlfriend has increased his stress. He could not recall his medications and said to phone Re[facility] to get his list, his meds come mail order. Pt ate ice cream for a HS snack. Pt drowsy and is eager to sleep. Placed on close observation staggered q 15 min checks. Falls band on pt and falls sign at pt door. `[facility]  Admission Note     Admission Type:   Admission Type:  Involuntary    Reason for admission:  Reason for Admission: \"cause my threrapist suggested I go because I was spiraling out of control, hearing voices I couldn't stop, anxiety real high, paranoid\"    PATIENT STRENGTHS:  Strengths: Communication, Motivated, Connection to output provider, Social Skills    Patient Strengths and Limitations:  Limitations: Tendency to isolate self, General negative or hopeless attitude about future/recovery, Lacks leisure interests    Addictive Behavior:   Addictive Behavior  In the past 3 months, have you felt or has someone told you that you have a problem with:  : None  Do you have a history of Chemical Use?: Yes  Do you have a history of Alcohol Use?: Yes  Do you have a history of Street Drug Abuse?: Yes  Histroy of Prescripton Drug Abuse?: No    Medical Problems:   History reviewed. No pertinent past medical history. Status EXAM:  Status and Exam  Normal: No  Facial Expression: Sad, Worried, Flat  Affect: Blunt  Level of Consciousness: Alert  Mood:Normal: No  Mood: Depressed, Anxious, Sad  Motor Activity:Normal: Yes  Interview Behavior: Cooperative  Preception: Danielson to Person, Cammie Rebel to Time, Danielson to Place, Danielson to Situation  Attention:Normal: Yes  Thought Content:Normal: Yes  Hallucinations: None, Auditory (Comment)(voiced say run, hide)  Delusions: No(None revealed)  Memory:Normal: No  Memory: Poor Recent, Poor Remote  Insight and Judgment: No  Insight and Judgment: Poor Insight  Present Suicidal Ideation: No  Present Homicidal Ideation: No    Tobacco Screening:  Practical Counseling, on admission, melany X, if applicable and completed (first 3 are required if patient doesn't refuse):             (x )  Recognizing danger situations (included triggers and roadblocks)                    (x )  Coping skills (new ways to manage stress, exercise, relaxation techniques, changing routine, distraction)                                                           (x )  Basic information about quitting (benefits of quitting, techniques in how to quit, available resources  ( x) Referral for counseling faxed to [facility]                                           ( ) Patient refused counseling  ( ) Patient has not smoked in the last 30 days    Metabolic Screening:    No results found for: LABA1C    No results found for: CHOL  No results found for: TRIG  No results found for: HDL  No components found for: LDLCAL  No results found for: LABVLDL      Body mass index is 29.03 kg/m². BP Readings from Last 2 Encounters:   09/18/20 (!) 143/83   06/16/18 (!) 157/89           Pt admitted with followings belongings:  Dentures: Lowers, Uppers  Vision - Corrective Lenses: None  Hearing Aid: None  Jewelry: Bracelet(copper)  Body Piercings Removed: N/A  Clothing: Pants, Shirt, Socks, Footwear  Were All Patient Medications Collected?: Not Applicable  Other Valuables: Wallet, Cell phone     Valuables sent home with-none. Valuables placed in safe in security envelope, number:  [id number]. Patient's home medications were -none. Patient oriented to surroundings and program expectations and copy of patient rights given. Received admission packet: With copies of code of conduct and treatment agreement. Consents reviewed, signed -involuntary rights. Refused -none. Patient verbalize understanding: To immediately seek any staff with any self threatening and/or violent ideations. Patient education on precautions: Close observation staggered q 15 min checks.                     Deyvi Reyna RN

## 2020-09-19 LAB
CHOLESTEROL, TOTAL: 121 MG/DL (ref 0–199)
HBA1C MFR BLD: 5 % (ref 4–5.6)
HDLC SERPL-MCNC: 42 MG/DL
LDL CHOLESTEROL CALCULATED: 70 MG/DL (ref 0–99)
TRIGL SERPL-MCNC: 45 MG/DL (ref 0–149)
VLDLC SERPL CALC-MCNC: 9 MG/DL

## 2020-09-19 PROCEDURE — 83036 HEMOGLOBIN GLYCOSYLATED A1C: CPT

## 2020-09-19 PROCEDURE — 1240000000 HC EMOTIONAL WELLNESS R&B

## 2020-09-19 PROCEDURE — 80061 LIPID PANEL: CPT

## 2020-09-19 PROCEDURE — 36415 COLL VENOUS BLD VENIPUNCTURE: CPT

## 2020-09-19 PROCEDURE — 6370000000 HC RX 637 (ALT 250 FOR IP): Performed by: PSYCHIATRY & NEUROLOGY

## 2020-09-19 PROCEDURE — 99231 SBSQ HOSP IP/OBS SF/LOW 25: CPT | Performed by: NURSE PRACTITIONER

## 2020-09-19 RX ADMIN — ARIPIPRAZOLE 15 MG: 15 TABLET ORAL at 09:38

## 2020-09-19 RX ADMIN — DIVALPROEX SODIUM 500 MG: 250 TABLET, DELAYED RELEASE ORAL at 20:30

## 2020-09-19 RX ADMIN — DIVALPROEX SODIUM 500 MG: 250 TABLET, DELAYED RELEASE ORAL at 09:38

## 2020-09-19 ASSESSMENT — PAIN SCALES - GENERAL
PAINLEVEL_OUTOF10: 0

## 2020-09-19 NOTE — PLAN OF CARE
Problem: Anger Management/Homicidal Ideation:  Goal: Ability to verbalize frustrations and anger appropriately will improve  Description: Ability to verbalize frustrations and anger appropriately will improve  Outcome: Ongoing  Goal: Absence of angry outbursts  Description: Absence of angry outbursts  9/19/2020 0843 by Raphael Medina RN  Outcome: Ongoing  9/19/2020 0538 by Terrence Chin RN  Outcome: Met This Shift   pt denies si. Pt denies homicidal ideation but states he is angry and irritable. Pt states he doesn't like people and he is trying to stay to himself so other people wont upset him. Pt c/o auditory hallucinations of voices that talk to him non command. Pt is guarded and suspicious with questioning. Pt takes med's and attends groups. Pt given educational paperwork on medications.

## 2020-09-19 NOTE — PLAN OF CARE
Out on the unit for dinner. Denies suicidal thoughts or intent to harm himself or others. No voiced delusions. Denies hallucinations. Reports he is having a hard time focusing on what he is actually thinking about.

## 2020-09-19 NOTE — GROUP NOTE
Group Therapy Note    Date: 9/19/2020    Group Start Time: 1000  Group End Time: 1050  Group Topic: Psychoeducation    [facility] [address]        Group Therapy Note    Attendees: 16           Patient's Goal:  Shave and get a shower    Notes: Active and engaged during goal setting group. Pleasant and social, sharing with peers. Status After Intervention:  Improved    Participation Level:  Active Listener and Interactive    Participation Quality: Appropriate and Attentive      Speech:  normal      Thought Process/Content: Logical      Affective Functioning: Congruent      Mood: euthymic      Level of consciousness:  Alert and Attentive      Response to Learning: Capable of insight, Able to change behavior and Progressing to goal      Endings: None Reported    Modes of Intervention: Education, Support, Socialization and Exploration      Discipline Responsible: Psychoeducational Specialist      Signature:  Thao Schuster, [address]

## 2020-09-19 NOTE — PROGRESS NOTES
BEHAVIORAL HEALTH FOLLOW-UP NOTE     9/19/2020     Patient was seen and examined in person, Chart reviewed   Patient's case discussed with staff/team    Chief Complaint: \" I get worked up and I am around other people. \"    Interim History: Patient ports made around other people he starts to feel paranoid. He is in his room guarded paranoid suspicious. He is not attending groups or socializing with peers. He denies any side effects medication. He denies SI/HI intent or plan denies auditory visual hallucinations. His affect is constricted he is tense. Appetite:   [x] Normal/Unchanged  [] Increased  [] Decreased      Sleep:       [x] Normal/Unchanged  [] Fair       [] Poor              Energy:    [x] Normal/Unchanged  [] Increased  [] Decreased        SI [] Present  [x] Absent    HI  []Present  [x] Absent     Aggression:  [] yes  [x] no    Patient is [x] able  [] unable to CONTRACT FOR SAFETY     PAST MEDICAL/PSYCHIATRIC HISTORY:   History reviewed. No pertinent past medical history. FAMILY/SOCIAL HISTORY:  History reviewed. No pertinent family history.   Social History     Socioeconomic History    Marital status:      Spouse name: Not on file    Number of children: Not on file    Years of education: Not on file    Highest education level: Not on file   Occupational History    Not on file   Social Needs    Financial resource strain: Not on file    Food insecurity     Worry: Not on file     Inability: Not on file    Transportation needs     Medical: Not on file     Non-medical: Not on file   Tobacco Use    Smoking status: Current Every Day Smoker     Packs/day: 0.50     Types: Cigarettes    Smokeless tobacco: Never Used   Substance and Sexual Activity    Alcohol use: Yes     Comment: daily    Drug use: Yes     Types: Cocaine, Marijuana    Sexual activity: Not on file   Lifestyle    Physical activity     Days per week: Not on file     Minutes per session: Not on file    Stress: Not on file magnesium hydroxide-simethicone (MAALOX) 200-200-20 MG/5ML suspension 30 mL, 30 mL, Oral, PRN, Cleo Centeno MD      Examination:  /74   Pulse 67   Temp 98.5 °F (36.9 °C) (Temporal)   Resp 16   Ht 6' 1\" (1.854 m)   Wt 220 lb (99.8 kg)   SpO2 98%   BMI 29.03 kg/m²   Gait - steady  Medication side effects(SE): Denies    Mental Status Examination:    Level of consciousness:  within normal limits   Appearance:  fair grooming and fair hygiene  Behavior/Motor:  no abnormalities noted  Attitude toward examiner:  cooperative  Speech:  spontaneous, normal rate and normal volume   Mood: constricted  Affect:  mood congruent  Thought processes:  linear   Thought content: Reports feeling tense and paranoid. Denies auditory visualizations denies SI/HI intent or plan  Cognition:  oriented to person, place, and time   Concentration intact  Insight poor   Judgement poor     ASSESSMENT:   Patient symptoms are:  [] Well controlled  [] Improving  [] Worsening  [x] No change      Diagnosis:   Active Problems:    Bipolar affective disorder, current episode manic with psychotic symptoms ([facility]    Cocaine abuse ([facility]  Resolved Problems:    * No resolved hospital problems. *      LABS:    Recent Labs     09/17/20  1550   WBC 6.9   HGB 15.5        Recent Labs     09/17/20  1550      K 3.6      CO2 25   BUN 11   CREATININE 1.0   GLUCOSE 82     Recent Labs     09/17/20  1550   BILITOT 0.5   ALKPHOS 59   AST 22   ALT 25     Lab Results   Component Value Date    LABAMPH NOT DETECTED 09/17/2020    BARBSCNU NOT DETECTED 09/17/2020    LABBENZ NOT DETECTED 09/17/2020    LABMETH NOT DETECTED 09/17/2020    OPIATESCREENURINE NOT DETECTED 09/17/2020    PHENCYCLIDINESCREENURINE NOT DETECTED 09/17/2020    ETOH <10 09/17/2020     No results found for: TSH, FREET4  No results found for: LITHIUM  No results found for: VALPROATE, CBMZ    Treatment Plan:  Reviewed current Medications with the patient.    Risks, benefits, side effects, drug-to-drug interactions and alternatives to treatment were discussed. Collateral information:   CD evaluation  Encourage patient to attend group and other milieu activities.   Discharge planning discussed with the patient and treatment team.    Continue Abilify 15 mg daily for psychosis  Continue Aristada injection 662 mg IM every 30 days due on 9/22/2020  Continue Depakote 500 mg twice daily for mood stabilization    PSYCHOTHERAPY/COUNSELING:  [x] Therapeutic interview  [x] Supportive  [] CBT  [] Ongoing  [] Other    [x] Patient continues to need, on a daily basis, active treatment furnished directly by or requiring the supervision of inpatient psychiatric personnel      Anticipated Length of stay: 5 to 7 days based on stability          Electronically signed by NETO Duque CNP on 0/81/4790 at 9:13 AM

## 2020-09-20 PROCEDURE — 6370000000 HC RX 637 (ALT 250 FOR IP): Performed by: PSYCHIATRY & NEUROLOGY

## 2020-09-20 PROCEDURE — 6370000000 HC RX 637 (ALT 250 FOR IP): Performed by: NURSE PRACTITIONER

## 2020-09-20 PROCEDURE — 1240000000 HC EMOTIONAL WELLNESS R&B

## 2020-09-20 PROCEDURE — 99232 SBSQ HOSP IP/OBS MODERATE 35: CPT | Performed by: NURSE PRACTITIONER

## 2020-09-20 RX ORDER — OXCARBAZEPINE 300 MG/1
300 TABLET, FILM COATED ORAL 2 TIMES DAILY
Status: DISCONTINUED | OUTPATIENT
Start: 2020-09-20 | End: 2020-09-21

## 2020-09-20 RX ADMIN — ACETAMINOPHEN 650 MG: 325 TABLET, FILM COATED ORAL at 16:59

## 2020-09-20 RX ADMIN — OXCARBAZEPINE 300 MG: 300 TABLET, FILM COATED ORAL at 20:59

## 2020-09-20 RX ADMIN — TRAZODONE HYDROCHLORIDE 50 MG: 50 TABLET ORAL at 20:59

## 2020-09-20 RX ADMIN — ARIPIPRAZOLE 15 MG: 15 TABLET ORAL at 08:28

## 2020-09-20 RX ADMIN — DIVALPROEX SODIUM 500 MG: 250 TABLET, DELAYED RELEASE ORAL at 08:28

## 2020-09-20 ASSESSMENT — PAIN SCALES - GENERAL
PAINLEVEL_OUTOF10: 0
PAINLEVEL_OUTOF10: 7
PAINLEVEL_OUTOF10: 0

## 2020-09-20 NOTE — PROGRESS NOTES
BEHAVIORAL HEALTH FOLLOW-UP NOTE     9/20/2020     Patient was seen and examined in person, Chart reviewed   Patient's case discussed with staff/team    Chief Complaint: \"When I take Depakote I get blood clots in my urine. \"    Interim History: Patient up on the unit remains guarded and suspicious he is sitting outside a group today but he is not socializing with peers that he is on the periphery. He reports getting blood clots in his urine from Depakote he states this always happens when he takes that medication. .  . He denies SI/HI intent or plan denies auditory visual hallucinations. His affect is constricted he is tense. Appetite:   [x] Normal/Unchanged  [] Increased  [] Decreased      Sleep:       [x] Normal/Unchanged  [] Fair       [] Poor              Energy:    [x] Normal/Unchanged  [] Increased  [] Decreased        SI [] Present  [x] Absent    HI  []Present  [x] Absent     Aggression:  [] yes  [x] no    Patient is [x] able  [] unable to CONTRACT FOR SAFETY     PAST MEDICAL/PSYCHIATRIC HISTORY:   History reviewed. No pertinent past medical history. FAMILY/SOCIAL HISTORY:  History reviewed. No pertinent family history.   Social History     Socioeconomic History    Marital status:      Spouse name: Not on file    Number of children: Not on file    Years of education: Not on file    Highest education level: Not on file   Occupational History    Not on file   Social Needs    Financial resource strain: Not on file    Food insecurity     Worry: Not on file     Inability: Not on file    Transportation needs     Medical: Not on file     Non-medical: Not on file   Tobacco Use    Smoking status: Current Every Day Smoker     Packs/day: 0.50     Types: Cigarettes    Smokeless tobacco: Never Used   Substance and Sexual Activity    Alcohol use: Yes     Comment: daily    Drug use: Yes     Types: Cocaine, Marijuana    Sexual activity: Not on file   Lifestyle    Physical activity     Days per week: Not on file     Minutes per session: Not on file    Stress: Not on file   Relationships    Social connections     Talks on phone: Not on file     Gets together: Not on file     Attends Rastafari service: Not on file     Active member of club or organization: Not on file     Attends meetings of clubs or organizations: Not on file     Relationship status: Not on file    Intimate partner violence     Fear of current or ex partner: Not on file     Emotionally abused: Not on file     Physically abused: Not on file     Forced sexual activity: Not on file   Other Topics Concern    Not on file   Social History Narrative    Not on file           ROS:  [x] All negative/unchanged except if checked.  Explain positive(checked items) below:  [] Constitutional  [] Eyes  [] Ear/Nose/Mouth/Throat  [] Respiratory  [] CV  [] GI  []   [] Musculoskeletal  [] Skin/Breast  [] Neurological  [] Endocrine  [] Heme/Lymph  [] Allergic/Immunologic    Explanation:     MEDICATIONS:    Current Facility-Administered Medications:     OXcarbazepine (TRILEPTAL) tablet 300 mg, 300 mg, Oral, BID, Nara NETO Cortes - CNP    nicotine polacrilex (COMMIT) lozenge 2 mg, 2 mg, Oral, PRN, Wanda Dominion NETO Packer - CNP    ARIPiprazole (ABILIFY) tablet 15 mg, 15 mg, Oral, Daily, Renee Samuels MD, 15 mg at 09/20/20 0828    [START ON 9/22/2020] ARIPiprazole lauroxil (ARISTADA) injection 662 mg, 662 mg, Intramuscular, Q30 Days, Renee Samuels MD    acetaminophen (TYLENOL) tablet 650 mg, 650 mg, Oral, Q6H PRN, Renee Samuels MD    hydrOXYzine (VISTARIL) capsule 50 mg, 50 mg, Oral, TID PRN, Renee Samuels MD    haloperidol lactate (HALDOL) injection 5 mg, 5 mg, Intramuscular, Q6H PRN **OR** haloperidol (HALDOL) tablet 5 mg, 5 mg, Oral, Q6H PRN, Renee Samuels MD    traZODone (DESYREL) tablet 50 mg, 50 mg, Oral, Nightly PRN, Renee Samuels MD    magnesium hydroxide (MILK OF MAGNESIA) 400 MG/5ML suspension 30 mL, 30 mL, Oral, Daily PRN, Darrick Bustillos MD    aluminum & magnesium hydroxide-simethicone (MAALOX) 200-200-20 MG/5ML suspension 30 mL, 30 mL, Oral, PRN, Darrick Bustillos MD      Examination:  BP (!) 141/79   Pulse 62   Temp 98.6 °F (37 °C) (Temporal)   Resp 20   Ht 6' 1\" (1.854 m)   Wt 220 lb (99.8 kg)   SpO2 97%   BMI 29.03 kg/m²   Gait - steady  Medication side effects(SE): Blood clots in urine from Depakote    Mental Status Examination:    Level of consciousness:  within normal limits   Appearance:  fair grooming and fair hygiene  Behavior/Motor:  no abnormalities noted  Attitude toward examiner:  cooperative  Speech:  spontaneous, normal rate and normal volume   Mood: constricted  Affect:  mood congruent  Thought processes:  linear   Thought content: Reports feeling tense and paranoid. Denies auditory visualizations denies SI/HI intent or plan  Cognition:  oriented to person, place, and time   Concentration intact  Insight poor   Judgement poor     ASSESSMENT:   Patient symptoms are:  [] Well controlled  [] Improving  [] Worsening  [x] No change      Diagnosis:   Active Problems:    Bipolar affective disorder, current episode manic with psychotic symptoms ([facility] [facility]    Cocaine abuse ([facility] [address]  Resolved Problems:    * No resolved hospital problems.  *      LABS:    Recent Labs     09/17/20  1550   WBC 6.9   HGB 15.5        Recent Labs     09/17/20  1550      K 3.6      CO2 25   BUN 11   CREATININE 1.0   GLUCOSE 82     Recent Labs     09/17/20  1550   BILITOT 0.5   ALKPHOS 59   AST 22   ALT 25     Lab Results   Component Value Date    LABAMPH NOT DETECTED 09/17/2020    BARBSCNU NOT DETECTED 09/17/2020    LABBENZ NOT DETECTED 09/17/2020    LABMETH NOT DETECTED 09/17/2020    OPIATESCREENURINE NOT DETECTED 09/17/2020    PHENCYCLIDINESCREENURINE NOT DETECTED 09/17/2020    ETOH <10 09/17/2020     No results found for: TSH, FREET4  No results found for: LITHIUM  No results found for: VALPROATE, CBMZ    Treatment Plan:  Reviewed current Medications with the patient. Risks, benefits, side effects, drug-to-drug interactions and alternatives to treatment were discussed. Collateral information:   CD evaluation  Encourage patient to attend group and other milieu activities.   Discharge planning discussed with the patient and treatment team.    Continue Abilify 15 mg daily for psychosis  Continue Aristada injection 662 mg IM every 30 days due on 9/22/2020  Discontinue Depakote  Start Trileptal 300 mg twice daily for mood stabilization in place of the Depakote    PSYCHOTHERAPY/COUNSELING:  [x] Therapeutic interview  [x] Supportive  [] CBT  [] Ongoing  [] Other    [x] Patient continues to need, on a daily basis, active treatment furnished directly by or requiring the supervision of inpatient psychiatric personnel      Anticipated Length of stay: 5 to 7 days based on stability          Electronically signed by NETO Lucas CNP on 7/55/7607 at 12:23 PM

## 2020-09-20 NOTE — PROGRESS NOTES
Group Therapy Note     Date: 9/20/2020     Group Start Time: 11:00  Group End Time: 11:30  Group Topic: Cognitive Skills     [facility], MSW, LSW           Group Therapy Note     Attendees: 14           Patient's Goal:  Pt will be able to identify triggers and learn to adapt coping skills.      Notes:  Pt was active in class discussion.      Status After Intervention:  Improved     Participation Level:  Active Listener and Interactive     Participation Quality: Appropriate, Attentive, Sharing and Supportive        Speech:  normal        Thought Process/Content: Logical        Affective Functioning: Blunted        Mood: depressed        Level of consciousness:  Alert, Oriented x4 and Attentive        Response to Learning: Able to verbalize current knowledge/experience, Able to verbalize/acknowledge new learning and Progressing to goal        Endings: None Reported     Modes of Intervention: Education, Support, Socialization and Exploration        Discipline Responsible: /Counselor        Signature: RADHA Porras LSW

## 2020-09-20 NOTE — GROUP NOTE
Group Therapy Note    Date: 9/20/2020    Group Start Time: 1000  Group End Time: 1181  Group Topic: Psychoeducation    [facility] [address]        Group Therapy Note    Attendees: 16       Notes: Active and engaged during discussion on positive steps to well being. Pleasant and social with peers. Status After Intervention:  Improved    Participation Level:  Active Listener and Interactive    Participation Quality: Appropriate, Attentive and Sharing      Speech:  normal      Thought Process/Content: Logical      Affective Functioning: Congruent      Mood: euthymic      Level of consciousness:  Alert and Attentive      Response to Learning: Capable of insight, Able to change behavior and Progressing to goal      Endings: None Reported    Modes of Intervention: Education, Support, Socialization and Exploration      Discipline Responsible: Psychoeducational Specialist      Signature:  Cristopher Ta Escanaba

## 2020-09-20 NOTE — PLAN OF CARE
Pleasant upon approach. Denies suicidal thoughts and a \"probably not\" homicidal thoughts  towards others. Denies thoughts of harm towards  himself or others. No voiced delusions. Denies hallucinations. Reports he is waiting for tomorrow to feel better.

## 2020-09-20 NOTE — PROGRESS NOTES
Pt voided and had 3 round dk red blood clots approximately 6mm in diameter in his urine. Pt denied pain or burning on urination. Pt said he passed \"blood clots\" before when he was taking Depakote.

## 2020-09-21 LAB — VALPROIC ACID LEVEL: 17 MCG/ML (ref 50–100)

## 2020-09-21 PROCEDURE — 6370000000 HC RX 637 (ALT 250 FOR IP): Performed by: PSYCHIATRY & NEUROLOGY

## 2020-09-21 PROCEDURE — 99232 SBSQ HOSP IP/OBS MODERATE 35: CPT | Performed by: NURSE PRACTITIONER

## 2020-09-21 PROCEDURE — 80164 ASSAY DIPROPYLACETIC ACD TOT: CPT

## 2020-09-21 PROCEDURE — 1240000000 HC EMOTIONAL WELLNESS R&B

## 2020-09-21 PROCEDURE — 6370000000 HC RX 637 (ALT 250 FOR IP): Performed by: NURSE PRACTITIONER

## 2020-09-21 PROCEDURE — 36415 COLL VENOUS BLD VENIPUNCTURE: CPT

## 2020-09-21 RX ORDER — HYDROCHLOROTHIAZIDE 25 MG/1
25 TABLET ORAL DAILY
COMMUNITY

## 2020-09-21 RX ORDER — AMLODIPINE BESYLATE 5 MG/1
10 TABLET ORAL DAILY
COMMUNITY

## 2020-09-21 RX ORDER — OXCARBAZEPINE 300 MG/1
450 TABLET, FILM COATED ORAL 2 TIMES DAILY
Status: DISCONTINUED | OUTPATIENT
Start: 2020-09-21 | End: 2020-09-22 | Stop reason: HOSPADM

## 2020-09-21 RX ORDER — LOSARTAN POTASSIUM 100 MG/1
100 TABLET ORAL DAILY
COMMUNITY

## 2020-09-21 RX ADMIN — OXCARBAZEPINE 450 MG: 300 TABLET, FILM COATED ORAL at 21:00

## 2020-09-21 RX ADMIN — OXCARBAZEPINE 300 MG: 300 TABLET, FILM COATED ORAL at 08:41

## 2020-09-21 RX ADMIN — TRAZODONE HYDROCHLORIDE 50 MG: 50 TABLET ORAL at 21:00

## 2020-09-21 RX ADMIN — ARIPIPRAZOLE 15 MG: 15 TABLET ORAL at 08:41

## 2020-09-21 ASSESSMENT — PAIN SCALES - GENERAL
PAINLEVEL_OUTOF10: 0

## 2020-09-21 NOTE — PROGRESS NOTES
Patient states \"I still feel a little bit agitated, but not like I am gonna explode\". Behavior has been in control. Patient denies suicidal ideation, denies homicidal ideation, denies hallucinations. Patient is compliant with medications, attends groups.  Patient appetite is good

## 2020-09-21 NOTE — PROGRESS NOTES
Attended afternoon meet and greet. Patient polite and educated on staffing changes and expectations. De-scalation discussed due to patient outburst on mileu. Patient 1 of 12 in attendance.

## 2020-09-21 NOTE — PROGRESS NOTES
BEHAVIORAL HEALTH FOLLOW-UP NOTE     9/21/2020     Patient was seen and examined in person, Chart reviewed   Patient's case discussed with staff/team    Chief Complaint: \"I am still feeling agitated. \"    Interim History: Patient seen during treatment team.  States he is still feeling agitated but not like he is going to explode. He states he talked to his son who reports that he believes he has been on Trileptal in the past.  He believes that the dose may need to increase. He is agreeable to the Aristada injection tomorrow. He does deny SI/HI and intent or plan denies auditory visual hallucinations. States he takes blood pressure medicine at the [place] he is asking to have that prescribed were able to find out what it is. Appetite:   [x] Normal/Unchanged  [] Increased  [] Decreased      Sleep:       [x] Normal/Unchanged  [] Fair       [] Poor              Energy:    [x] Normal/Unchanged  [] Increased  [] Decreased        SI [] Present  [x] Absent    HI  []Present  [x] Absent     Aggression:  [] yes  [x] no    Patient is [x] able  [] unable to CONTRACT FOR SAFETY     PAST MEDICAL/PSYCHIATRIC HISTORY:   History reviewed. No pertinent past medical history. FAMILY/SOCIAL HISTORY:  History reviewed. No pertinent family history.   Social History     Socioeconomic History    Marital status:      Spouse name: Not on file    Number of children: Not on file    Years of education: Not on file    Highest education level: Not on file   Occupational History    Not on file   Social Needs    Financial resource strain: Not on file    Food insecurity     Worry: Not on file     Inability: Not on file    Transportation needs     Medical: Not on file     Non-medical: Not on file   Tobacco Use    Smoking status: Current Every Day Smoker     Packs/day: 0.50     Types: Cigarettes    Smokeless tobacco: Never Used   Substance and Sexual Activity    Alcohol use: Yes     Comment: daily    Drug use: Yes     Types: Cocaine, Marijuana    Sexual activity: Not on file   Lifestyle    Physical activity     Days per week: Not on file     Minutes per session: Not on file    Stress: Not on file   Relationships    Social connections     Talks on phone: Not on file     Gets together: Not on file     Attends [employer] service: Not on file     Active member of club or organization: Not on file     Attends meetings of clubs or organizations: Not on file     Relationship status: Not on file    Intimate partner violence     Fear of current or ex partner: Not on file     Emotionally abused: Not on file     Physically abused: Not on file     Forced sexual activity: Not on file   Other Topics Concern    Not on file   Social History Narrative    Not on file           ROS:  [x] All negative/unchanged except if checked.  Explain positive(checked items) below:  [] Constitutional  [] Eyes  [] Ear/Nose/Mouth/Throat  [] Respiratory  [] CV  [] GI  []   [] Musculoskeletal  [] Skin/Breast  [] Neurological  [] Endocrine  [] Heme/Lymph  [] Allergic/Immunologic    Explanation:     MEDICATIONS:    Current Facility-Administered Medications:     OXcarbazepine (TRILEPTAL) tablet 450 mg, 450 mg, Oral, BID, NETO Adames - CNP    nicotine polacrilex (COMMIT) lozenge 2 mg, 2 mg, Oral, PRN, NETO Kovacs - CNP    ARIPiprazole (ABILIFY) tablet 15 mg, 15 mg, Oral, Daily, Darrick Bustillos MD, 15 mg at 09/21/20 0841    [START ON 9/22/2020] ARIPiprazole lauroxil (ARISTADA) injection 662 mg, 662 mg, Intramuscular, Q30 Days, Darrick Bustillos MD    acetaminophen (TYLENOL) tablet 650 mg, 650 mg, Oral, Q6H PRN, Darrick Bustillos MD, 650 mg at 09/20/20 1659    hydrOXYzine (VISTARIL) capsule 50 mg, 50 mg, Oral, TID PRN, Darrick Bustillos MD    haloperidol lactate (HALDOL) injection 5 mg, 5 mg, Intramuscular, Q6H PRN **OR** haloperidol (HALDOL) tablet 5 mg, 5 mg, Oral, Q6H PRN, Darrick Bustillos MD    traZODone (DESYREL) tablet 50 mg, 50 mg, Oral, Nightly PRN, Lindsey Motta MD, 50 mg at 09/20/20 2059    magnesium hydroxide (MILK OF MAGNESIA) 400 MG/5ML suspension 30 mL, 30 mL, Oral, Daily PRN, Lindsey Motta MD    aluminum & magnesium hydroxide-simethicone (MAALOX) 200-200-20 MG/5ML suspension 30 mL, 30 mL, Oral, PRN, Lindsey Motta MD      Examination:  /71   Pulse 60   Temp 97.9 °F (36.6 °C) (Temporal)   Resp 14   Ht 6' 1\" (1.854 m)   Wt 220 lb (99.8 kg)   SpO2 97%   BMI 29.03 kg/m²   Gait - steady  Medication side effects(SE): Blood clots in urine from Depakote    Mental Status Examination:    Level of consciousness:  within normal limits   Appearance:  fair grooming and fair hygiene  Behavior/Motor:  no abnormalities noted  Attitude toward examiner:  cooperative  Speech:  spontaneous, normal rate and normal volume   Mood: constricted  Affect:  mood congruent  Thought processes:  linear   Thought content: Reports feeling tense and paranoid. Denies auditory visualizations denies SI/HI intent or plan  Cognition:  oriented to person, place, and time   Concentration intact  Insight poor   Judgement poor     ASSESSMENT:   Patient symptoms are:  [] Well controlled  [] Improving  [] Worsening  [x] No change      Diagnosis:   Active Problems:    Bipolar affective disorder, current episode manic with psychotic symptoms ([facility] [address]    [facility]  Resolved Problems:    * No resolved hospital problems. *      LABS:    No results for input(s): WBC, HGB, PLT in the last 72 hours. No results for input(s): NA, K, CL, CO2, BUN, CREATININE, GLUCOSE in the last 72 hours. No results for input(s): BILITOT, ALKPHOS, AST, ALT in the last 72 hours.   Lab Results   Component Value Date    LABAMPH NOT DETECTED 09/17/2020    BARBSCNU NOT DETECTED 09/17/2020    LABBENZ NOT DETECTED 09/17/2020    LABMETH NOT DETECTED 09/17/2020    OPIATESCREENURINE NOT DETECTED 09/17/2020    PHENCYCLIDINESCREENURINE NOT DETECTED 09/17/2020    ETOH <10 09/17/2020 No results found for: TSH, FREET4  No results found for: LITHIUM  Lab Results   Component Value Date    VALPROATE 17 (L) 09/21/2020       Treatment Plan:  Reviewed current Medications with the patient. Risks, benefits, side effects, drug-to-drug interactions and alternatives to treatment were discussed. Collateral information:   CD evaluation  Encourage patient to attend group and other milieu activities.   Discharge planning discussed with the patient and treatment team.    Continue Abilify 15 mg daily for psychosis  Continue Aristada injection 662 mg IM every 30 days due on 9/22/2020  Increase 450 mg twice daily for mood stabilization in place of the Depakote    PSYCHOTHERAPY/COUNSELING:  [x] Therapeutic interview  [x] Supportive  [] CBT  [] Ongoing  [] Other    [x] Patient continues to need, on a daily basis, active treatment furnished directly by or requiring the supervision of inpatient psychiatric personnel      Anticipated Length of stay: 5 to 7 days based on stability          Electronically signed by NETO Gonzalez CNP on 3/11/9676 at 9:30 AM

## 2020-09-21 NOTE — PLAN OF CARE
Problem: Falls - Risk of:  Goal: Will remain free from falls  Description: Will remain free from falls  9/21/2020 1033 by Poncho Gastelum RN  Outcome: Ongoing  9/21/2020 0537 by Ines Black RN  Outcome: Met This Shift  Goal: Absence of physical injury  Description: Absence of physical injury  9/21/2020 1033 by Poncho Gastelum RN  Outcome: Ongoing  9/21/2020 0537 by Ines Black RN  Outcome: Met This Shift     Problem: Anger Management/Homicidal Ideation:  Goal: Ability to verbalize frustrations and anger appropriately will improve  Description: Ability to verbalize frustrations and anger appropriately will improve  Outcome: Ongoing  Goal: Absence of angry outbursts  Description: Absence of angry outbursts  9/21/2020 1033 by Poncho Gastelum RN  Outcome: Ongoing  9/21/2020 0537 by Ines Black RN  Outcome: Met This Shift     Problem: Altered Mood, Psychotic Behavior:  Goal: Able to verbalize reality based thinking  Description: Able to verbalize reality based thinking  9/21/2020 1033 by Poncho Gastelum RN  Outcome: Ongoing  9/21/2020 0537 by Ines Black RN  Outcome: Ongoing  Goal: Absence of self-harm  Description: Absence of self-harm  9/21/2020 1033 by Poncho Gastelum RN  Outcome: Ongoing  9/21/2020 0537 by Ines Black RN  Outcome: Met This Shift

## 2020-09-21 NOTE — PROGRESS NOTES
[address] [place] [address]  Day 3 Interdisciplinary Treatment Plan NOTE    Review Date & Time: 09/21/2020 0900    Patient was in treatment team    Admission Type:   Admission Type: Involuntary    Reason for admission:  Reason for Admission: \"cause my threrapist suggested I go because I was spiraling out of control, hearing voices I couldn't stop, anxiety real high, paranoid\"  Estimated Length of Stay Update:  3-5 days  Estimated Discharge Date Update: 9/24/2020    PATIENT STRENGTHS:  Patient Strengths Strengths: Communication  Patient Strengths and Limitations:Limitations: Lacks leisure interests, Multiple barriers to leisure interests  Addictive Behavior:Addictive Behavior  In the past 3 months, have you felt or has someone told you that you have a problem with:  : None  Do you have a history of Chemical Use?: Yes  Do you have a history of Alcohol Use?: No  Do you have a history of Street Drug Abuse?: No  Histroy of Prescripton Drug Abuse?: No  Medical Problems:History reviewed. No pertinent past medical history. Risk:  Fall RiskTotal: 75  Ke Scale Ke Scale Score: 22  BVC Total: 0  Change in scores none. Changes to plan of Care none    Status EXAM:   Status and Exam  Normal: Yes(Resting in bed apparnetly asleep)  Facial Expression: Worried, Sad  Affect: Congruent  Level of Consciousness: Alert  Mood:Normal: No  Mood: Depressed, Anxious, Sad  Motor Activity:Normal: Yes  Motor Activity: Decreased  Interview Behavior: Evasive  Preception: [place] to Person, Michelle Cheek to Time, [place] to Place, [place] to Situation  Attention:Normal: No  Attention: Distractible  Thought Processes: Circumstantial  Thought Content:Normal: No  Thought Content: Delusions  Hallucinations: Auditory (Comment)  Delusions: Yes  Delusions:  Other(See Comment)(paranoid)  Memory:Normal: No  Memory: Confabulation, Poor Recent  Insight and Judgment: No  Insight and Judgment: Poor Judgment, Poor Insight  Present Suicidal Ideation: No  Present

## 2020-09-22 VITALS
HEIGHT: 73 IN | TEMPERATURE: 98.8 F | BODY MASS INDEX: 29.16 KG/M2 | RESPIRATION RATE: 18 BRPM | HEART RATE: 65 BPM | DIASTOLIC BLOOD PRESSURE: 91 MMHG | OXYGEN SATURATION: 97 % | SYSTOLIC BLOOD PRESSURE: 166 MMHG | WEIGHT: 220 LBS

## 2020-09-22 PROCEDURE — 6370000000 HC RX 637 (ALT 250 FOR IP): Performed by: PSYCHIATRY & NEUROLOGY

## 2020-09-22 PROCEDURE — 99239 HOSP IP/OBS DSCHRG MGMT >30: CPT | Performed by: NURSE PRACTITIONER

## 2020-09-22 PROCEDURE — 6370000000 HC RX 637 (ALT 250 FOR IP): Performed by: NURSE PRACTITIONER

## 2020-09-22 RX ORDER — ARIPIPRAZOLE 15 MG/1
15 TABLET ORAL DAILY
Qty: 30 TABLET | Refills: 3 | Status: SHIPPED | OUTPATIENT
Start: 2020-09-23 | End: 2021-04-21

## 2020-09-22 RX ORDER — POLYETHYLENE GLYCOL 3350 17 G
2 POWDER IN PACKET (EA) ORAL PRN
Qty: 100 EACH | Refills: 0
Start: 2020-09-22 | End: 2020-10-22

## 2020-09-22 RX ORDER — OXCARBAZEPINE 150 MG/1
450 TABLET, FILM COATED ORAL 2 TIMES DAILY
Qty: 180 TABLET | Refills: 0 | Status: SHIPPED | OUTPATIENT
Start: 2020-09-22 | End: 2020-10-22

## 2020-09-22 RX ORDER — PETROLATUM 42 G/100G
OINTMENT TOPICAL 2 TIMES DAILY PRN
Status: DISCONTINUED | OUTPATIENT
Start: 2020-09-22 | End: 2020-09-22 | Stop reason: HOSPADM

## 2020-09-22 RX ADMIN — ARIPIPRAZOLE 15 MG: 15 TABLET ORAL at 09:06

## 2020-09-22 RX ADMIN — OXCARBAZEPINE 450 MG: 300 TABLET, FILM COATED ORAL at 09:06

## 2020-09-22 ASSESSMENT — PAIN SCALES - GENERAL: PAINLEVEL_OUTOF10: 0

## 2020-09-22 NOTE — GROUP NOTE
Group Therapy Note    Date: 9/22/2020    Group Start Time: 1120  Group End Time: 1150  Group Topic: Cognitive Skills    [facility]    VALERIE Dale        Group Therapy Note    Attendees: 15         Patient's Goal:  Pt will be able to participate in discussion of ways to set limits with others. Saying no was discussed as well as tips  of how to be assertive with others. Notes:  Pt participated in class discussion. Status After Intervention:  Improved    Participation Level:  Active Listener and Interactive    Participation Quality: Appropriate, Attentive, Sharing and Supportive      Speech:  normal      Thought Process/Content: Logical      Affective Functioning: Blunted      Mood: depressed      Level of consciousness:  Alert, Oriented x4 and Attentive      Response to Learning: Able to verbalize current knowledge/experience, Able to verbalize/acknowledge new learning and Progressing to goal      Endings: None Reported    Modes of Intervention: Education, Support, Socialization and Problem-solving      Discipline Responsible: /Counselor      Signature:  VALERIE Diez

## 2020-09-22 NOTE — PROGRESS NOTES
Pt was caught receiving a lap dance in [employer] [facility]. Pt's asked to go to their rooms. Pt was embarrassed and covering his face. Library locked for remainder of shift.

## 2020-09-22 NOTE — PROGRESS NOTES
CLINICAL PHARMACY NOTE: MEDS TO [address] Select Patient?: No  Total # of Prescriptions Filled: 2   The following medications were delivered to the patient:  · Abilify 15  · Trileptal 150  Total # of Interventions Completed: 2  Time Spent (min): 30    Additional Documentation:

## 2020-09-22 NOTE — GROUP NOTE
Group Therapy Note    Date: 9/22/2020    Group Start Time: 1000  Group End Time: 1030  Group Topic: Psychoeducation    [facility]    Imelda Khoury, CTRS        Group Therapy Note    Number of participants: 13  Type of group: Psychoeducation  Mode of intervention: Education, Support, Socialization, Exploration, Clarifying, and Problem-solving  Topic: DBT:ACCEPTS  Objective: PT will identify ways to utilize the ACCEPTS acronym in recovery. Notes:  Pt was interactive during group sharing ways to utilize the ACCEPTS acronym in recovery. Pt shared imagery has helped to deal with negative thoughts and gave support to others. Status After Intervention:  Improved    Participation Level:  Active Listener and Interactive    Participation Quality: Appropriate, Attentive, Sharing and Supportive      Speech:  normal      Thought Process/Content: Logical      Affective Functioning: Congruent      Mood: euthymic      Level of consciousness:  Alert, Oriented x4 and Attentive      Response to Learning: Able to verbalize current knowledge/experience, Able to verbalize/acknowledge new learning, Able to retain information, Capable of insight, Able to change behavior and Progressing to goal      Endings: None Reported    Modes of Intervention: Education, Support, Socialization, Exploration, Clarifying and Problem-solving

## 2020-09-22 NOTE — PLAN OF CARE
Mood is anxious   denies SI/HI   denies hallucinations   complaint of needed something for cracked feet also states that he has severe back pain but the meds that the [place] give him do not work  cooperative with meds   will continue to monitor

## 2020-09-25 NOTE — DISCHARGE SUMMARY
DISCHARGE SUMMARY      Patient ID:  Akbar Moreno  [id number]  65 y.o.  1960    Admit date: 9/17/2020    Discharge date and time: 9/22/20    Admitting Physician: Carlos Manuel Herrera MD     Discharge Physician: Dr Gerardo Ayala MD    Admission Diagnoses: Paranoia (psychosis) ([facility] [F22]  Paranoia (psychosis) ([facility].) [F22]    Admission Condition: poor    Discharged Condition: stable    Admission Circumstance: Patient presented to the ED positive for cocaine reporting suicidal homicidal ideations. PAST MEDICAL/PSYCHIATRIC HISTORY:   History reviewed. No pertinent past medical history. FAMILY/SOCIAL HISTORY:  History reviewed. No pertinent family history.   Social History     Socioeconomic History    Marital status:      Spouse name: Not on file    Number of children: Not on file    Years of education: Not on file    Highest education level: Not on file   Occupational History    Not on file   Social Needs    Financial resource strain: Not on file    Food insecurity     Worry: Not on file     Inability: Not on file    Transportation needs     Medical: Not on file     Non-medical: Not on file   Tobacco Use    Smoking status: Current Every Day Smoker     Packs/day: 0.50     Types: Cigarettes    Smokeless tobacco: Never Used   Substance and Sexual Activity    Alcohol use: Yes     Comment: daily    Drug use: Yes     Types: Cocaine, Marijuana    Sexual activity: Not on file   Lifestyle    Physical activity     Days per week: Not on file     Minutes per session: Not on file    Stress: Not on file   Relationships    Social connections     Talks on phone: Not on file     Gets together: Not on file     Attends Jewish service: Not on file     Active member of club or organization: Not on file     Attends meetings of clubs or organizations: Not on file     Relationship status: Not on file    Intimate partner violence     Fear of current or ex partner: Not on file     Emotionally abused: Not on file Physically abused: Not on file     Forced sexual activity: Not on file   Other Topics Concern    Not on file   Social History Narrative    Not on file       MEDICATIONS:  No current facility-administered medications for this encounter. Current Outpatient Medications:     ARIPiprazole (ABILIFY) 15 MG tablet, Take 1 tablet by mouth daily, Disp: 30 tablet, Rfl: 3    [START ON 10/22/2020] ARIPiprazole lauroxil (ARISTADA) 662 MG/2.4ML PRSY injection, Inject 2.4 mLs into the muscle every 30 days for 1 dose Aristada 662 mg IM q 30 days last injection given 9/22/20, next injection due 10/22/20, Disp: 2.4 mL, Rfl: 0    nicotine polacrilex (COMMIT) 2 MG lozenge, Take 1 lozenge by mouth as needed for Smoking cessation, Disp: 100 each, Rfl: 0    OXcarbazepine (TRILEPTAL) 150 MG tablet, Take 3 tablets by mouth 2 times daily, Disp: 180 tablet, Rfl: 0    losartan (COZAAR) 100 MG tablet, Take 100 mg by mouth daily, Disp: , Rfl:     amLODIPine (NORVASC) 5 MG tablet, Take 10 mg by mouth daily, Disp: , Rfl:     hydroCHLOROthiazide (HYDRODIURIL) 25 MG tablet, Take 25 mg by mouth daily, Disp: , Rfl:     Examination:  BP (!) 166/91   Pulse 65   Temp 98.8 °F (37.1 °C) (Temporal)   Resp 18   Ht 6' 1\" (1.854 m)   Wt 220 lb (99.8 kg)   SpO2 97%   BMI 29.03 kg/m²   Gait - steady    HOSPITAL COURSE[de-identified]  Patient admitted to 9/17/2020 was closely monitored for suicidal and homicidal ideations. He was evaluated and was treated with Abilify 15 mg daily, the Aristada injection 662 mg IM every 30 days which she received on 9/22/2020 his next injection be due on 10/22/2020. Medical instrument significant patient continued to improve on the floor. He start coming out of his room is attending groups of socializing with peers. He never made any suicidal statements or suicidal gestures while in the unit. He said that he went to stepdown to the crisis debilitation unit on discharge.    obtain confirmation from patient's

## 2021-11-19 ENCOUNTER — APPOINTMENT (OUTPATIENT)
Dept: GENERAL RADIOLOGY | Age: 61
End: 2021-11-19
Payer: COMMERCIAL

## 2021-11-19 ENCOUNTER — HOSPITAL ENCOUNTER (EMERGENCY)
Age: 61
Discharge: HOME | End: 2021-11-19
Payer: COMMERCIAL

## 2021-11-19 VITALS
TEMPERATURE: 98.3 F | WEIGHT: 225 LBS | OXYGEN SATURATION: 100 % | BODY MASS INDEX: 29.82 KG/M2 | SYSTOLIC BLOOD PRESSURE: 164 MMHG | HEIGHT: 73 IN | DIASTOLIC BLOOD PRESSURE: 93 MMHG | RESPIRATION RATE: 18 BRPM | HEART RATE: 87 BPM

## 2021-11-19 DIAGNOSIS — S93.402A SPRAIN OF LEFT ANKLE, UNSPECIFIED LIGAMENT, INITIAL ENCOUNTER: Primary | ICD-10-CM

## 2021-11-19 PROCEDURE — 99284 EMERGENCY DEPT VISIT MOD MDM: CPT

## 2021-11-19 PROCEDURE — 6370000000 HC RX 637 (ALT 250 FOR IP): Performed by: NURSE PRACTITIONER

## 2021-11-19 PROCEDURE — 73610 X-RAY EXAM OF ANKLE: CPT

## 2021-11-19 RX ORDER — IBUPROFEN 800 MG/1
800 TABLET ORAL ONCE
Status: COMPLETED | OUTPATIENT
Start: 2021-11-19 | End: 2021-11-19

## 2021-11-19 RX ORDER — IBUPROFEN 800 MG/1
800 TABLET ORAL EVERY 8 HOURS PRN
Qty: 21 TABLET | Refills: 0 | Status: SHIPPED | OUTPATIENT
Start: 2021-11-19 | End: 2021-11-26

## 2021-11-19 RX ADMIN — IBUPROFEN 800 MG: 800 TABLET, FILM COATED ORAL at 01:52

## 2021-11-19 ASSESSMENT — PAIN SCALES - GENERAL
PAINLEVEL_OUTOF10: 10
PAINLEVEL_OUTOF10: 9

## 2021-11-19 ASSESSMENT — PAIN DESCRIPTION - PAIN TYPE: TYPE: ACUTE PAIN

## 2021-11-19 ASSESSMENT — PAIN DESCRIPTION - FREQUENCY: FREQUENCY: CONTINUOUS

## 2021-11-19 ASSESSMENT — PAIN DESCRIPTION - LOCATION: LOCATION: ANKLE

## 2021-11-19 ASSESSMENT — PAIN DESCRIPTION - ORIENTATION: ORIENTATION: LEFT

## 2021-11-19 NOTE — ED PROVIDER NOTES
Independent   HPI: Lizeth Krishnan 63 y.o. male with a past medical history of History reviewed. No pertinent past medical history. presents status post Left  ankle injury. The patient states that the injury happened acutely. The history is obtained from the patient.  The mechanism of injury is apparent and was lateral and medial  of the foot. Patient describes the pain as aching and pressure. Patient states the pain worsens on ambulation and with any weightbearing. Patient denies any distal neurologic symptoms including numbness, tingling, paralysis or coolness of the foot. No other reported injuries or other extremity tenderness or injuries. Patient presents to the emergency department with [facility] police. Patient reports that 1 week ago he twisted his left ankle. He reports since then he has had increased swelling. States that he did not get any imaging. Patient denies take anything at home for symptom relief. Patient initially refused by the skilled nursing due to the concern regarding the left ankle injury. Patient otherwise denies any numbness or tingling to his left lower extremity he denied striking his head is not on any anticoagulation use. Symptoms mild in severity and persistent. Patient observed to be able to ambulate easily without difficulty to ED bed 33      Review of Systems:   Pertinent positives and negatives are stated within HPI, all other systems reviewed and are negative.             --------------------------------------------- PAST HISTORY ---------------------------------------------  Past Medical History:  has no past medical history on file. Past Surgical History:  has no past surgical history on file. Social History:  reports that he has been smoking cigarettes. He has been smoking about 0.50 packs per day. He has never used smokeless tobacco. He reports current alcohol use. He reports current drug use. Drugs: Cocaine and Marijuana ([facility]).     Family History: family history is not on file. The patients home medications have been reviewed. Allergies: Sulfa antibiotics    Physical exam:  Constitutional: The patient is comfortable, well appearing, non toxic. The patient is alert and oriented and conversant.     Head: The head is atraumatic and normocephalic.     Eyes: No discharge is present from the eyes. The sclera are normal.     ENT: The oropharynx is normal. The mouth is normal to inspection.     Neck: Normal range of motion is achieved in the neck. .     Respiratory/chest: The chest is nontender. Breath sounds are normal. There is no respiratory distress.     Cardiovascular: Heart shows a regular rate and rhythm without murmurs, rubs or gallops.     Lower extremity exam: There is no obvious compartment syndrome. The knee evaluation shows that both knees have no deformity, no swelling, and no proximal fibular head tenderness. There is full painless range of motion of both hips. The ankle evaluation shows normal tendon function, capillary refill less than 2 seconds, distal motor function which is intact, distal sensory function which is intact. The achilies tendon is intact. There is localized swelling and tenderness noted of the ankle along the lateral and medial aspect . The foot evaluation shows no tenderness at the base of the fifth metatarsal. There are no lacerations or evidence of open fracture.      ------------------------- NURSING NOTES AND VITALS REVIEWED ---------------------------   The nursing notes within the ED encounter and vital signs as below have been reviewed by myself. BP (!) 164/93   Pulse 87   Temp 98.3 °F (36.8 °C)   Resp 18   Ht 6' 1\" (1.854 m)   Wt 225 lb (102.1 kg)   SpO2 100%   BMI 29.69 kg/m²   Oxygen Saturation Interpretation: Normal    The patients available past medical records and past encounters were reviewed.           -------------------------------------------------- RESULTS -------------------------------------------------  I have personally reviewed all laboratory and imaging results for this patient. Results are listed below. LABS:  No results found for this visit on 11/19/21. RADIOLOGY:  Interpreted by Radiologist.  XR ANKLE LEFT (MIN 3 VIEWS)   Final Result   No acute abnormality identified. Mild degenerative change.                 ------------------------------ ED COURSE/MEDICAL DECISION MAKING----------------------  Medications   ibuprofen (ADVIL;MOTRIN) tablet 800 mg (800 mg Oral Given 11/19/21 0152)         ED COURSE:       Medical decision making: Left  ankle injury with concerns for an ankle fracture based on physical exam. Films are obtained and are negative for fracture at this time.  Plan is subsequently for symptom control limited weight-bearing and ankle immobilization.        Impression:   1) Ankle Sprain    Disposition:  Discharge    Condition:  Stable        Shruthi Adams, APRN - CNP  11/19/21 4863  ATTENDING PROVIDER ATTESTATION:     Supervising Physician, on-site, available for consultation, non-participatory in the evaluation or care of this patient       Ramiro Kelsey MD  11/19/21 0401
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
